# Patient Record
Sex: FEMALE | Race: WHITE | NOT HISPANIC OR LATINO
[De-identification: names, ages, dates, MRNs, and addresses within clinical notes are randomized per-mention and may not be internally consistent; named-entity substitution may affect disease eponyms.]

---

## 2017-07-18 ENCOUNTER — APPOINTMENT (OUTPATIENT)
Dept: OPHTHALMOLOGY | Facility: CLINIC | Age: 62
End: 2017-07-18

## 2017-07-18 RX ORDER — KETOCONAZOLE 20.5 MG/ML
2 SHAMPOO, SUSPENSION TOPICAL
Qty: 120 | Refills: 0 | Status: ACTIVE | COMMUNITY
Start: 2017-06-11

## 2017-07-18 RX ORDER — CLONAZEPAM 0.5 MG/1
0.5 TABLET ORAL
Qty: 180 | Refills: 0 | Status: ACTIVE | COMMUNITY
Start: 2017-06-17

## 2017-07-18 RX ORDER — HYDROCHLOROTHIAZIDE 25 MG/1
25 TABLET ORAL
Qty: 60 | Refills: 0 | Status: ACTIVE | COMMUNITY
Start: 2016-11-28

## 2017-07-18 RX ORDER — PANTOPRAZOLE 40 MG/1
40 TABLET, DELAYED RELEASE ORAL
Qty: 30 | Refills: 0 | Status: ACTIVE | COMMUNITY
Start: 2017-07-07

## 2017-07-18 RX ORDER — ARIPIPRAZOLE 2 MG/1
2 TABLET ORAL
Qty: 60 | Refills: 0 | Status: ACTIVE | COMMUNITY
Start: 2017-01-04

## 2017-07-18 RX ORDER — NORTRIPTYLINE HYDROCHLORIDE 75 MG/1
75 CAPSULE ORAL
Qty: 120 | Refills: 0 | Status: ACTIVE | COMMUNITY
Start: 2016-12-14

## 2017-07-18 RX ORDER — MUPIROCIN 20 MG/G
2 OINTMENT TOPICAL
Qty: 22 | Refills: 0 | Status: ACTIVE | COMMUNITY
Start: 2017-03-17

## 2017-07-18 RX ORDER — LEVOTHYROXINE SODIUM 125 UG/1
125 TABLET ORAL
Qty: 60 | Refills: 0 | Status: ACTIVE | COMMUNITY
Start: 2016-06-07

## 2017-07-18 RX ORDER — SODIUM SULFATE, POTASSIUM SULFATE, MAGNESIUM SULFATE 17.5; 3.13; 1.6 G/ML; G/ML; G/ML
17.5-3.13-1.6 SOLUTION, CONCENTRATE ORAL
Qty: 354 | Refills: 0 | Status: ACTIVE | COMMUNITY
Start: 2017-06-30

## 2017-07-18 RX ORDER — LEVOTHYROXINE SODIUM 150 UG/1
150 TABLET ORAL
Qty: 8 | Refills: 0 | Status: ACTIVE | COMMUNITY
Start: 2017-06-19

## 2017-07-18 RX ORDER — LEVOTHYROXINE SODIUM 137 UG/1
137 TABLET ORAL
Qty: 20 | Refills: 0 | Status: ACTIVE | COMMUNITY
Start: 2017-06-19

## 2018-07-20 ENCOUNTER — APPOINTMENT (OUTPATIENT)
Dept: OPHTHALMOLOGY | Facility: CLINIC | Age: 63
End: 2018-07-20
Payer: COMMERCIAL

## 2018-07-20 PROCEDURE — 92012 INTRM OPH EXAM EST PATIENT: CPT

## 2019-07-23 ENCOUNTER — APPOINTMENT (OUTPATIENT)
Dept: OPHTHALMOLOGY | Facility: CLINIC | Age: 64
End: 2019-07-23
Payer: COMMERCIAL

## 2019-07-23 ENCOUNTER — NON-APPOINTMENT (OUTPATIENT)
Age: 64
End: 2019-07-23

## 2019-07-23 PROCEDURE — 92012 INTRM OPH EXAM EST PATIENT: CPT

## 2020-08-21 ENCOUNTER — APPOINTMENT (OUTPATIENT)
Dept: OPHTHALMOLOGY | Facility: CLINIC | Age: 65
End: 2020-08-21
Payer: COMMERCIAL

## 2020-08-21 ENCOUNTER — NON-APPOINTMENT (OUTPATIENT)
Age: 65
End: 2020-08-21

## 2020-08-21 ENCOUNTER — TRANSCRIPTION ENCOUNTER (OUTPATIENT)
Age: 65
End: 2020-08-21

## 2020-08-21 PROCEDURE — 92012 INTRM OPH EXAM EST PATIENT: CPT

## 2021-09-03 ENCOUNTER — NON-APPOINTMENT (OUTPATIENT)
Age: 66
End: 2021-09-03

## 2021-09-03 ENCOUNTER — APPOINTMENT (OUTPATIENT)
Dept: OPHTHALMOLOGY | Facility: CLINIC | Age: 66
End: 2021-09-03
Payer: COMMERCIAL

## 2021-09-03 PROCEDURE — 92015 DETERMINE REFRACTIVE STATE: CPT

## 2021-09-03 PROCEDURE — 92014 COMPRE OPH EXAM EST PT 1/>: CPT

## 2022-08-05 ENCOUNTER — APPOINTMENT (OUTPATIENT)
Dept: OPHTHALMOLOGY | Facility: CLINIC | Age: 67
End: 2022-08-05

## 2022-08-05 ENCOUNTER — NON-APPOINTMENT (OUTPATIENT)
Age: 67
End: 2022-08-05

## 2022-08-05 PROCEDURE — 92012 INTRM OPH EXAM EST PATIENT: CPT

## 2022-09-09 ENCOUNTER — NON-APPOINTMENT (OUTPATIENT)
Age: 67
End: 2022-09-09

## 2022-09-09 ENCOUNTER — APPOINTMENT (OUTPATIENT)
Dept: OPHTHALMOLOGY | Facility: CLINIC | Age: 67
End: 2022-09-09

## 2022-09-09 PROCEDURE — 92014 COMPRE OPH EXAM EST PT 1/>: CPT

## 2023-09-08 ENCOUNTER — APPOINTMENT (OUTPATIENT)
Dept: PULMONOLOGY | Facility: CLINIC | Age: 68
End: 2023-09-08
Payer: MEDICARE

## 2023-09-08 ENCOUNTER — APPOINTMENT (OUTPATIENT)
Dept: CT IMAGING | Facility: IMAGING CENTER | Age: 68
End: 2023-09-08
Payer: MEDICARE

## 2023-09-08 ENCOUNTER — LABORATORY RESULT (OUTPATIENT)
Age: 68
End: 2023-09-08

## 2023-09-08 ENCOUNTER — OUTPATIENT (OUTPATIENT)
Dept: OUTPATIENT SERVICES | Facility: HOSPITAL | Age: 68
LOS: 1 days | End: 2023-09-08
Payer: MEDICARE

## 2023-09-08 VITALS — DIASTOLIC BLOOD PRESSURE: 78 MMHG | OXYGEN SATURATION: 97 % | HEART RATE: 71 BPM | SYSTOLIC BLOOD PRESSURE: 131 MMHG

## 2023-09-08 DIAGNOSIS — R06.09 OTHER FORMS OF DYSPNEA: ICD-10-CM

## 2023-09-08 PROCEDURE — 88738 HGB QUANT TRANSCUTANEOUS: CPT

## 2023-09-08 PROCEDURE — 71250 CT THORAX DX C-: CPT

## 2023-09-08 PROCEDURE — 94060 EVALUATION OF WHEEZING: CPT

## 2023-09-08 PROCEDURE — 94727 GAS DIL/WSHOT DETER LNG VOL: CPT

## 2023-09-08 PROCEDURE — 71250 CT THORAX DX C-: CPT | Mod: 26,MH

## 2023-09-08 PROCEDURE — 95012 NITRIC OXIDE EXP GAS DETER: CPT

## 2023-09-08 PROCEDURE — ZZZZZ: CPT

## 2023-09-08 PROCEDURE — 36415 COLL VENOUS BLD VENIPUNCTURE: CPT

## 2023-09-08 PROCEDURE — 99204 OFFICE O/P NEW MOD 45 MIN: CPT | Mod: 25

## 2023-09-08 PROCEDURE — 71046 X-RAY EXAM CHEST 2 VIEWS: CPT

## 2023-09-08 PROCEDURE — 94729 DIFFUSING CAPACITY: CPT

## 2023-09-09 NOTE — CONSULT LETTER
[Dear  ___] : Dear  [unfilled], [Courtesy Letter:] : I had the pleasure of seeing your patient, [unfilled], in my office today. [Please see my note below.] : Please see my note below. [Consult Closing:] : Thank you very much for allowing me to participate in the care of this patient.  If you have any questions, please do not hesitate to contact me. [Sincerely,] : Sincerely, [FreeTextEntry3] : Dr. Cele Connolly

## 2023-09-09 NOTE — ASSESSMENT
[FreeTextEntry1] : Venipuncture with labs drawn in office.  Obtained and reviewed pulmonary function test, NIOX, chest x-ray results with patient today.  Ordered A noncontrast chest CT scan to further evaluate her underlying lung parenchyma. Return for pulmonary follow up after chest CT scans been performed.

## 2023-09-09 NOTE — ADDENDUM
[FreeTextEntry1] : I, Jemal Anne, acted solely as a scribe for Dr. Cele Connolly D.O., on this date 09/08/2023.   All medical record entries made by the Scribe were at my, Dr. Cele Connolly D.O., direction and personally dictated by me on 09/08/2023. I have reviewed the chart and agree that the record accurately reflects my personal performance of the history, physical exam, assessment and plan. I have also personally directed, reviewed, and agreed with the chart.

## 2023-09-09 NOTE — DISCUSSION/SUMMARY
[FreeTextEntry1] : Ms. LEANN HEDRICK is an 68 year old female, with remote history of sarcoidosis, erythema nodosum.  Patient has dyspnea of unclear etiology at this point, rule out sarcoidosis, rule out PE (unlikely), rule out physical deconditioning, rule out anxiety, etc.

## 2023-09-09 NOTE — HISTORY OF PRESENT ILLNESS
[Never] : never [TextBox_4] : LEANN HEDRICK is a 68 year female, with history of sarcoidosis 38 years ago, lung nodules, erythema nodosum, who presents to the office for an initial Pulmonary evaluation. Patient reports of having symptoms of dyspnea and dizziness for a couple of months. She states of receiving a CT Angiogram to evaluate her dyspnea which was unremarkable. Patient denies of having any cigarette smoking history. She denies of having a Hx of asthma.

## 2023-09-09 NOTE — REASON FOR VISIT
[Consultation] : a consultation [Shortness of Breath] : shortness of breath [Sarcoidosis] : sarcoidosis [TextBox_44] : Dizziness [TextBox_13] : Dr. David Costello

## 2023-09-12 ENCOUNTER — TRANSCRIPTION ENCOUNTER (OUTPATIENT)
Age: 68
End: 2023-09-12

## 2023-09-17 LAB
ACE BLD-CCNC: 76 U/L
ALBUMIN SERPL ELPH-MCNC: 4.8 G/DL
ALP BLD-CCNC: 63 U/L
ALT SERPL-CCNC: 21 U/L
ALTERN TENCAPG(M6): <2 MCG/ML
ANA PAT FLD IF-IMP: ABNORMAL
ANA SER IF-ACNC: ABNORMAL
ANION GAP SERPL CALC-SCNC: 15 MMOL/L
ASPER FUMCAPG(M3): <2 MCG/ML
AST SERPL-CCNC: 31 U/L
AUREOBASCAPG(M12): <2 MCG/ML
BASOPHILS # BLD AUTO: 0.04 K/UL
BASOPHILS NFR BLD AUTO: 0.9 %
BILIRUB SERPL-MCNC: 0.4 MG/DL
BUN SERPL-MCNC: 19 MG/DL
CALCIUM SERPL-MCNC: 10 MG/DL
CHLORIDE SERPL-SCNC: 104 MMOL/L
CK SERPL-CCNC: 78 U/L
CO2 SERPL-SCNC: 23 MMOL/L
CREAT SERPL-MCNC: 0.78 MG/DL
CRP SERPL-MCNC: <3 MG/L
DEPRECATED D DIMER PPP IA-ACNC: <150 NG/ML DDU
EGFR: 84 ML/MIN/1.73M2
ENA JO1 AB SER IA-ACNC: <0.2 AL
ENA SCL70 IGG SER IA-ACNC: <0.2 AL
EOSINOPHIL # BLD AUTO: 0.05 K/UL
EOSINOPHIL NFR BLD AUTO: 1.1 %
ERYTHROCYTE [SEDIMENTATION RATE] IN BLOOD BY WESTERGREN METHOD: 24 MM/HR
GLUCOSE SERPL-MCNC: 93 MG/DL
HCT VFR BLD CALC: 41.9 %
HGB BLD-MCNC: 13.9 G/DL
IMM GRANULOCYTES NFR BLD AUTO: 0.2 %
LACEYELLA SACCHARI IGG: <2 MCG/ML
LYMPHOCYTES # BLD AUTO: 1.72 K/UL
LYMPHOCYTES NFR BLD AUTO: 38.2 %
MAN DIFF?: NORMAL
MCHC RBC-ENTMCNC: 29.8 PG
MCHC RBC-ENTMCNC: 33.2 GM/DL
MCV RBC AUTO: 89.9 FL
MICROPOLYCAPG(M22): <2 MCG/ML
MONOCYTES # BLD AUTO: 0.33 K/UL
MONOCYTES NFR BLD AUTO: 7.3 %
NEUTROPHILS # BLD AUTO: 2.35 K/UL
NEUTROPHILS NFR BLD AUTO: 52.3 %
PENIC CHRYCAPG(M1): <2 MCG/ML
PHOMA BETAE IGG: <2 MCG/ML
PLATELET # BLD AUTO: 277 K/UL
POCT - HEMOGLOBIN (HGB), QUANTITATIVE, TRANSCUTANEOUS: 13.2
POTASSIUM SERPL-SCNC: 4.5 MMOL/L
PROT SERPL-MCNC: 6.8 G/DL
RBC # BLD: 4.66 M/UL
RBC # FLD: 13.3 %
RHEUMATOID FACT SER QL: <10 IU/ML
SODIUM SERPL-SCNC: 143 MMOL/L
TRICHODERMA VIRIDE IGG: <2 MCG/ML
WBC # FLD AUTO: 4.5 K/UL

## 2023-09-28 ENCOUNTER — RESULT CHARGE (OUTPATIENT)
Age: 68
End: 2023-09-28

## 2023-09-29 ENCOUNTER — APPOINTMENT (OUTPATIENT)
Dept: PULMONOLOGY | Facility: CLINIC | Age: 68
End: 2023-09-29
Payer: MEDICARE

## 2023-09-29 VITALS — OXYGEN SATURATION: 98 % | HEART RATE: 65 BPM | DIASTOLIC BLOOD PRESSURE: 78 MMHG | SYSTOLIC BLOOD PRESSURE: 162 MMHG

## 2023-09-29 PROCEDURE — 99214 OFFICE O/P EST MOD 30 MIN: CPT

## 2023-10-13 ENCOUNTER — APPOINTMENT (OUTPATIENT)
Dept: OPHTHALMOLOGY | Facility: CLINIC | Age: 68
End: 2023-10-13
Payer: MEDICARE

## 2023-10-13 ENCOUNTER — NON-APPOINTMENT (OUTPATIENT)
Age: 68
End: 2023-10-13

## 2023-10-13 PROCEDURE — 92014 COMPRE OPH EXAM EST PT 1/>: CPT

## 2023-12-07 ENCOUNTER — APPOINTMENT (OUTPATIENT)
Dept: PULMONOLOGY | Facility: CLINIC | Age: 68
End: 2023-12-07
Payer: MEDICARE

## 2023-12-07 PROCEDURE — 94375 RESPIRATORY FLOW VOLUME LOOP: CPT

## 2023-12-07 PROCEDURE — 94621 CARDIOPULM EXERCISE TESTING: CPT

## 2023-12-29 ENCOUNTER — APPOINTMENT (OUTPATIENT)
Dept: PULMONOLOGY | Facility: CLINIC | Age: 68
End: 2023-12-29
Payer: MEDICARE

## 2023-12-29 VITALS
OXYGEN SATURATION: 98 % | RESPIRATION RATE: 16 BRPM | WEIGHT: 127 LBS | HEART RATE: 70 BPM | BODY MASS INDEX: 23.23 KG/M2 | SYSTOLIC BLOOD PRESSURE: 134 MMHG | DIASTOLIC BLOOD PRESSURE: 75 MMHG

## 2023-12-29 DIAGNOSIS — R91.8 OTHER NONSPECIFIC ABNORMAL FINDING OF LUNG FIELD: ICD-10-CM

## 2023-12-29 PROCEDURE — 99214 OFFICE O/P EST MOD 30 MIN: CPT

## 2023-12-29 NOTE — REASON FOR VISIT
[Follow-Up] : a follow-up visit [Abnormal CXR/ Chest CT] : an abnormal CXR/ chest CT [Shortness of Breath] : shortness of breath [Sarcoidosis] : sarcoidosis

## 2023-12-31 PROBLEM — R91.8 LUNG NODULES: Status: ACTIVE | Noted: 2023-09-29

## 2023-12-31 NOTE — ASSESSMENT
[FreeTextEntry1] : Cardiopulmonary stress result on December 7, 2023 discussed with patient and  at length in the office today. Patient's respiratory symptoms are likely secondary to physical deconditioning, so I strongly advised her on aerobic exercise for better physical conditioning. Return for pulmonary follow-up in 6 months.

## 2023-12-31 NOTE — HISTORY OF PRESENT ILLNESS
[Never] : never [TextBox_4] : LEANN HEDRICK is a 68 year female, with history of sarcoidosis, lung nodules, erythema nodosum, who presents to the office for an initial Pulmonary evaluation. Patient reports that she continues to have symptoms of dyspnea.

## 2023-12-31 NOTE — DISCUSSION/SUMMARY
[FreeTextEntry1] : Ms. LEANN HEDRICK is an 68 year old female, with remote history of sarcoidosis, erythema nodosum. Patient continues to have symptoms of dyspnea of unknown etiology at this time, rule out physical deconditioning. Secondly, patient has small right upper lobe groundglass nodules indicated in recent chest CT scan, likely secondary to scarring.

## 2023-12-31 NOTE — PROCEDURE
[FreeTextEntry1] :  CT Chest No Cont             Final  No Documents Attached    	 EXAM: [id number] - CT CHEST  - ORDERED BY: ROD VO   PROCEDURE DATE:  09/08/2023    INTERPRETATION:  INDICATION: Sarcoidosis  TECHNIQUE: Helical acquisition images of the chest without intravenous contrast. Maximum intensity projection images were generated.  COMPARISON: None.  FINDINGS:  LUNGS/AIRWAYS/PLEURA: Patent trachea and bronchi. No pleural abnormality. Adjacent right upper lobe 4 mm groundglass nodules (3-124, 3-141). Right upper lobe 2 mm solid nodule (2-36).  LYMPH NODES/MEDIASTINUM: Thyroidectomy. No lymphadenopathy.  HEART/VASCULATURE: Normal heart size. No pericardial effusion. 3.8 cm ascending aorta.  UPPER ABDOMEN: Unremarkable.  BONES/SOFT TISSUES: Asymmetrically dense right breast.   IMPRESSION:  No finding of sarcoidosis.  Small right upper lobe groundglass nodules. Recommend surveillance CT chest in one year.  --- End of Report ---       MATTHIAS DUQUE M.D., ATTENDING RADIOLOGIST This document has been electronically signed. Sep 19 2023 11:12AM  Ordered by: ROD VO       Collected/Examined: 08Sep2023 03:38PM       Verified by: ROD VO 19Sep2023 12:07PM       Result Communication: No patient communication needed at this time; Stage: Final       Performed at: [facility]       Resulted: 19Sep2023 11:06AM       Last Updated: 19Sep2023 12:07PM       Accession: [id number]

## 2023-12-31 NOTE — ADDENDUM
[FreeTextEntry1] : I, Jemal Anne, acted solely as a scribe for Dr. Cele Connolly D.O., on this date 09/29/2023.   All medical record entries made by the Scribe were at my, Dr. Cele Connolly D.O., direction and personally dictated by me on 09/29/2023. I have reviewed the chart and agree that the record accurately reflects my personal performance of the history, physical exam, assessment and plan. I have also personally directed, reviewed, and agreed with the chart.

## 2024-05-09 ENCOUNTER — NON-APPOINTMENT (OUTPATIENT)
Age: 69
End: 2024-05-09

## 2024-06-07 ENCOUNTER — APPOINTMENT (OUTPATIENT)
Dept: PULMONOLOGY | Facility: CLINIC | Age: 69
End: 2024-06-07
Payer: MEDICARE

## 2024-06-07 ENCOUNTER — APPOINTMENT (OUTPATIENT)
Dept: PULMONOLOGY | Facility: CLINIC | Age: 69
End: 2024-06-07

## 2024-06-07 VITALS
SYSTOLIC BLOOD PRESSURE: 147 MMHG | DIASTOLIC BLOOD PRESSURE: 80 MMHG | RESPIRATION RATE: 16 BRPM | HEART RATE: 60 BPM | OXYGEN SATURATION: 99 %

## 2024-06-07 DIAGNOSIS — R06.09 OTHER FORMS OF DYSPNEA: ICD-10-CM

## 2024-06-07 DIAGNOSIS — D86.9 SARCOIDOSIS, UNSPECIFIED: ICD-10-CM

## 2024-06-07 DIAGNOSIS — R53.81 OTHER MALAISE: ICD-10-CM

## 2024-06-07 PROCEDURE — 88738 HGB QUANT TRANSCUTANEOUS: CPT

## 2024-06-07 PROCEDURE — 99214 OFFICE O/P EST MOD 30 MIN: CPT | Mod: 25

## 2024-06-07 PROCEDURE — 94729 DIFFUSING CAPACITY: CPT

## 2024-06-07 PROCEDURE — 95012 NITRIC OXIDE EXP GAS DETER: CPT

## 2024-06-07 PROCEDURE — 94060 EVALUATION OF WHEEZING: CPT

## 2024-06-07 PROCEDURE — 94727 GAS DIL/WSHOT DETER LNG VOL: CPT

## 2024-06-09 LAB — POCT - HEMOGLOBIN (HGB), QUANTITATIVE, TRANSCUTANEOUS: 12.8

## 2024-06-09 NOTE — DISCUSSION/SUMMARY
[FreeTextEntry1] : Ms. LEANN HEDRICK is an 68 year old female, with remote history of sarcoidosis, erythema nodosum. Patient appears stable from a pulmonary perspective. Chest tightness likely secondary to physical deconditioning.

## 2024-06-09 NOTE — ASSESSMENT
[FreeTextEntry1] : Obtained and reviewed pulmonary function test, NIOX results with patient today.  Advised patient on aerobic exercise to improve pulmonary conditioning. Return for pulmonary follow up in 6 months.

## 2024-06-09 NOTE — PROCEDURE
[FreeTextEntry1] : Pulmonary Function Test obtained in office today which revealed: Spirometry: Within normal limits with no improvement post bronchodilator, Lung Volume: Within normal limits, Diffusion: Within normal limits.  ___  Exhaled Nitric Oxide             Final  No Documents Attached    	Test  	Result  	Flag	Reference	Goal	Last Verified  	Exhaled Nitric Oxide	7	 	 		REQUIRED  Ordered by: ROD VO       Collected/Examined: [id number] 10:29AM       Verification Required       Stage: Final       Performed at: In Office       Performed by: ROD VO       Resulted: [id number] 10:29AM       Last Updated: 07Jun2024 10:29AM

## 2024-06-09 NOTE — HISTORY OF PRESENT ILLNESS
[Never] : never [TextBox_4] : LEANN HEDRICK is a 68 year female, with history of sarcoidosis, lung nodules, erythema nodosum, who presents to the office for an initial Pulmonary evaluation. Patient reports of having acute symptoms of chest tightness due to humidity. She denies of having any symptoms of dyspnea, chest pain, or wheezing. Patient reports that she has been performing aerobic exercise to improve respiratory symptoms.

## 2024-06-09 NOTE — ADDENDUM
[FreeTextEntry1] : I, Jemal Anne, acted solely as a scribe for Dr. Cele Connolly D.O., on this date 06/07/2024.   All medical record entries made by the Scribe were at my, Dr. Cele Connolly D.O., direction and personally dictated by me on 06/07/2024. I have reviewed the chart and agree that the record accurately reflects my personal performance of the history, physical exam, assessment and plan. I have also personally directed, reviewed, and agreed with the chart.

## 2024-09-27 ENCOUNTER — APPOINTMENT (OUTPATIENT)
Dept: OPHTHALMOLOGY | Facility: CLINIC | Age: 69
End: 2024-09-27
Payer: MEDICARE

## 2024-09-27 ENCOUNTER — NON-APPOINTMENT (OUTPATIENT)
Age: 69
End: 2024-09-27

## 2024-09-27 PROCEDURE — 92014 COMPRE OPH EXAM EST PT 1/>: CPT

## 2024-09-27 PROCEDURE — 92015 DETERMINE REFRACTIVE STATE: CPT

## 2024-12-06 ENCOUNTER — APPOINTMENT (OUTPATIENT)
Dept: PULMONOLOGY | Facility: CLINIC | Age: 69
End: 2024-12-06
Payer: MEDICARE

## 2024-12-06 VITALS
SYSTOLIC BLOOD PRESSURE: 129 MMHG | HEART RATE: 73 BPM | HEIGHT: 62 IN | BODY MASS INDEX: 23 KG/M2 | OXYGEN SATURATION: 98 % | WEIGHT: 125 LBS | DIASTOLIC BLOOD PRESSURE: 81 MMHG

## 2024-12-06 DIAGNOSIS — R53.81 OTHER MALAISE: ICD-10-CM

## 2024-12-06 DIAGNOSIS — D86.9 SARCOIDOSIS, UNSPECIFIED: ICD-10-CM

## 2024-12-06 DIAGNOSIS — R06.09 OTHER FORMS OF DYSPNEA: ICD-10-CM

## 2024-12-06 DIAGNOSIS — R91.8 OTHER NONSPECIFIC ABNORMAL FINDING OF LUNG FIELD: ICD-10-CM

## 2024-12-06 PROCEDURE — 99214 OFFICE O/P EST MOD 30 MIN: CPT | Mod: 25

## 2024-12-06 PROCEDURE — 94729 DIFFUSING CAPACITY: CPT

## 2024-12-06 PROCEDURE — 94010 BREATHING CAPACITY TEST: CPT

## 2024-12-06 PROCEDURE — 94727 GAS DIL/WSHOT DETER LNG VOL: CPT

## 2024-12-06 PROCEDURE — 88738 HGB QUANT TRANSCUTANEOUS: CPT

## 2024-12-06 PROCEDURE — ZZZZZ: CPT

## 2024-12-08 LAB — POCT - HEMOGLOBIN (HGB), QUANTITATIVE, TRANSCUTANEOUS: 14.6

## 2024-12-13 ENCOUNTER — APPOINTMENT (OUTPATIENT)
Dept: CT IMAGING | Facility: IMAGING CENTER | Age: 69
End: 2024-12-13
Payer: MEDICARE

## 2024-12-13 ENCOUNTER — OUTPATIENT (OUTPATIENT)
Dept: OUTPATIENT SERVICES | Facility: HOSPITAL | Age: 69
LOS: 1 days | End: 2024-12-13
Payer: MEDICARE

## 2024-12-13 DIAGNOSIS — D86.9 SARCOIDOSIS, UNSPECIFIED: ICD-10-CM

## 2024-12-13 PROCEDURE — 71250 CT THORAX DX C-: CPT | Mod: 26,MH

## 2024-12-13 PROCEDURE — 71250 CT THORAX DX C-: CPT

## 2025-01-31 ENCOUNTER — APPOINTMENT (OUTPATIENT)
Dept: PULMONOLOGY | Facility: CLINIC | Age: 70
End: 2025-01-31
Payer: MEDICARE

## 2025-01-31 VITALS — OXYGEN SATURATION: 96 % | HEART RATE: 63 BPM

## 2025-01-31 VITALS — SYSTOLIC BLOOD PRESSURE: 129 MMHG | DIASTOLIC BLOOD PRESSURE: 72 MMHG

## 2025-01-31 DIAGNOSIS — R91.8 OTHER NONSPECIFIC ABNORMAL FINDING OF LUNG FIELD: ICD-10-CM

## 2025-01-31 DIAGNOSIS — D86.9 SARCOIDOSIS, UNSPECIFIED: ICD-10-CM

## 2025-01-31 PROCEDURE — 99213 OFFICE O/P EST LOW 20 MIN: CPT
